# Patient Record
Sex: MALE | Race: WHITE | Employment: FULL TIME | URBAN - NONMETROPOLITAN AREA
[De-identification: names, ages, dates, MRNs, and addresses within clinical notes are randomized per-mention and may not be internally consistent; named-entity substitution may affect disease eponyms.]

---

## 2019-07-17 ENCOUNTER — HOSPITAL ENCOUNTER (EMERGENCY)
Age: 24
Discharge: HOME | End: 2019-07-17

## 2019-07-17 ENCOUNTER — HOSPITAL ENCOUNTER (EMERGENCY)
Dept: GENERAL RADIOLOGY | Age: 24
Discharge: HOME | End: 2019-07-17

## 2019-07-17 VITALS
HEART RATE: 57 BPM | SYSTOLIC BLOOD PRESSURE: 126 MMHG | WEIGHT: 242 LBS | RESPIRATION RATE: 16 BRPM | TEMPERATURE: 98.3 F | BODY MASS INDEX: 33.75 KG/M2 | OXYGEN SATURATION: 97 % | DIASTOLIC BLOOD PRESSURE: 58 MMHG
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
## 2019-07-17 DIAGNOSIS — S62.231A CLOSED DISPLACED FRACTURE OF BASE OF FIRST METACARPAL BONE OF RIGHT HAND, UNSPECIFIED FRACTURE MORPHOLOGY, INITIAL ENCOUNTER: Primary | ICD-10-CM

## 2019-07-17 PROCEDURE — 73130 X-RAY EXAM OF HAND: CPT

## 2019-07-17 PROCEDURE — 29125 APPL SHORT ARM SPLINT STATIC: CPT | Performed by: NURSE PRACTITIONER

## 2019-07-17 PROCEDURE — 99214 OFFICE O/P EST MOD 30 MIN: CPT

## 2019-07-17 PROCEDURE — 29125 APPL SHORT ARM SPLINT STATIC: CPT

## 2019-07-17 PROCEDURE — 2709999900 HC NON-CHARGEABLE SUPPLY

## 2019-07-17 PROCEDURE — 99202 OFFICE O/P NEW SF 15 MIN: CPT | Performed by: NURSE PRACTITIONER

## 2019-07-17 ASSESSMENT — PAIN DESCRIPTION - FREQUENCY: FREQUENCY: INTERMITTENT

## 2019-07-17 ASSESSMENT — ENCOUNTER SYMPTOMS
COLOR CHANGE: 0
VOMITING: 0
NAUSEA: 0
TROUBLE SWALLOWING: 0
SHORTNESS OF BREATH: 0

## 2019-07-17 ASSESSMENT — PAIN DESCRIPTION - LOCATION: LOCATION: HAND

## 2019-07-17 ASSESSMENT — PAIN DESCRIPTION - ORIENTATION: ORIENTATION: RIGHT

## 2019-07-17 ASSESSMENT — PAIN DESCRIPTION - DESCRIPTORS: DESCRIPTORS: ACHING;PINS AND NEEDLES

## 2019-07-17 ASSESSMENT — PAIN SCALES - GENERAL: PAINLEVEL_OUTOF10: 7

## 2019-07-17 ASSESSMENT — PAIN DESCRIPTION - PAIN TYPE: TYPE: ACUTE PAIN

## 2019-07-17 NOTE — ED NOTES
Patient understood instructions verbally,  Follow up with [facility] for recheck fx. Rt. Hand. Educated patient how to  Monitor for any circulation problems, ambulated self to lobby,stable condition. Webril applied to rt. Hand/wrist, thumb spica applied. tolerated well.      Imani Montoya LPN  87/98/97 4711

## 2019-07-17 NOTE — ED PROVIDER NOTES
[address]       Chief Complaint   Patient presents with    Hand Injury     right hand pain, swelling       Nurses Notes reviewed and I agree except as noted in the HPI. HISTORY OF PRESENT ILLNESS   Devorah Lorenzo is a 22 y.o. male who presents with c/o right hand injury. Injury last night while wrestling with his brother. Patient's right hand struck a wall. Patient right handed. Pain aching, intermittent. Rates 7/10, worse with movement/use. No numbness/tingling. Patient works FT at [employer]. No treatment prior to arrival.  No additional complaints. REVIEW OF SYSTEMS     Review of Systems   Constitutional: Negative for chills, diaphoresis, fatigue and fever. HENT: Negative for trouble swallowing. Respiratory: Negative for shortness of breath. Cardiovascular: Negative for chest pain and palpitations. Gastrointestinal: Negative for nausea and vomiting. Musculoskeletal: Positive for arthralgias and joint swelling. Skin: Negative for color change and pallor. Neurological: Negative for weakness and numbness. Psychiatric/Behavioral: Negative for sleep disturbance. PAST MEDICAL HISTORY         Diagnosis Date    Ear infection     Obesity, Class II, BMI 35-39.9        SURGICAL HISTORY     Patient  has a past surgical history that includes mastoidectomy (Left). CURRENT MEDICATIONS     There are no discharge medications for this patient. ALLERGIES     Patient is is allergic to morphine. FAMILY HISTORY     Patient'sfamily history includes Diabetes in his father; High Blood Pressure in his father. SOCIAL HISTORY     Patient  reports that he has been smoking cigarettes and cigars. He has been smoking about 0.10 packs per day. He has never used smokeless tobacco. He reports that he drinks alcohol. He reports that he does not use drugs.     PHYSICAL EXAM     ED TRIAGE VITALS  BP: (!)

## 2020-02-18 ENCOUNTER — HOSPITAL ENCOUNTER (EMERGENCY)
Age: 25
Discharge: HOME | End: 2020-02-18
Payer: COMMERCIAL

## 2020-02-18 VITALS
RESPIRATION RATE: 18 BRPM | WEIGHT: 270 LBS | SYSTOLIC BLOOD PRESSURE: 127 MMHG | TEMPERATURE: 98.4 F | HEART RATE: 78 BPM | OXYGEN SATURATION: 97 % | DIASTOLIC BLOOD PRESSURE: 71 MMHG | BODY MASS INDEX: 37.66 KG/M2

## 2020-02-18 PROCEDURE — 99213 OFFICE O/P EST LOW 20 MIN: CPT | Performed by: NURSE PRACTITIONER

## 2020-02-18 PROCEDURE — 99212 OFFICE O/P EST SF 10 MIN: CPT

## 2020-02-18 RX ORDER — OFLOXACIN 3 MG/ML
5 SOLUTION AURICULAR (OTIC) 3 TIMES DAILY
Qty: 7.5 ML | Refills: 0 | Status: SHIPPED | OUTPATIENT
Start: 2020-02-18 | End: 2020-02-28

## 2020-02-18 ASSESSMENT — ENCOUNTER SYMPTOMS
SHORTNESS OF BREATH: 0
COUGH: 0
VOMITING: 0
NAUSEA: 0
SORE THROAT: 0

## 2020-02-18 ASSESSMENT — PAIN DESCRIPTION - PAIN TYPE: TYPE: ACUTE PAIN

## 2020-02-18 ASSESSMENT — PAIN DESCRIPTION - ORIENTATION: ORIENTATION: LEFT

## 2020-02-18 ASSESSMENT — PAIN DESCRIPTION - LOCATION: LOCATION: EAR

## 2020-02-18 ASSESSMENT — PAIN SCALES - GENERAL: PAINLEVEL_OUTOF10: 10

## 2020-02-18 ASSESSMENT — PAIN DESCRIPTION - FREQUENCY: FREQUENCY: CONTINUOUS

## 2020-12-02 ENCOUNTER — HOSPITAL ENCOUNTER (EMERGENCY)
Age: 25
Discharge: HOME | End: 2020-12-02
Attending: NURSE PRACTITIONER
Payer: COMMERCIAL

## 2020-12-02 VITALS
WEIGHT: 280 LBS | RESPIRATION RATE: 16 BRPM | DIASTOLIC BLOOD PRESSURE: 67 MMHG | SYSTOLIC BLOOD PRESSURE: 139 MMHG | BODY MASS INDEX: 37.93 KG/M2 | OXYGEN SATURATION: 98 % | TEMPERATURE: 97.5 F | HEART RATE: 70 BPM | HEIGHT: 72 IN

## 2020-12-02 PROCEDURE — 99213 OFFICE O/P EST LOW 20 MIN: CPT | Performed by: NURSE PRACTITIONER

## 2020-12-02 PROCEDURE — 99212 OFFICE O/P EST SF 10 MIN: CPT

## 2020-12-02 RX ORDER — ONDANSETRON 4 MG/1
4 TABLET, ORALLY DISINTEGRATING ORAL EVERY 8 HOURS PRN
Qty: 20 TABLET | Refills: 0 | Status: SHIPPED | OUTPATIENT
Start: 2020-12-02 | End: 2021-01-03

## 2020-12-02 ASSESSMENT — ENCOUNTER SYMPTOMS
DIARRHEA: 0
COUGH: 0
RHINORRHEA: 0
SHORTNESS OF BREATH: 0
NAUSEA: 1
CHEST TIGHTNESS: 0
SORE THROAT: 0
VOMITING: 1

## 2020-12-02 NOTE — ED PROVIDER NOTES
[facility]  Urgent Care Encounter       CHIEF COMPLAINT       Chief Complaint   Patient presents with    Emesis       Nurses Notes reviewed and I agree except as noted in the HPI. HISTORY OF PRESENT ILLNESS   Shanna Henley is a 26 y.o. male who presents to the [facility] urgent care for evaluation of nausea and vomiting. Patient reports waking up this morning feeling normal.  He reports driving to work developing nausea. He reports 2-3 episodes of vomiting. He reports at this point he only feels mild nausea. He denies abdominal pain. Foot is mildly tender to right upper quadrant. He does report headache while in the active vomiting. He denies fever, chills, dizziness, blurred vision, chest pain, palpitation, dyspnea, cough,, diarrhea, constipation, and dysuria. The history is provided by the patient. REVIEW OF SYSTEMS     Review of Systems   Constitutional: Negative for activity change, appetite change, chills, fatigue and fever. HENT: Negative for ear discharge, ear pain, rhinorrhea and sore throat. Respiratory: Negative for cough, chest tightness and shortness of breath. Cardiovascular: Negative for chest pain. Gastrointestinal: Positive for nausea and vomiting. Negative for diarrhea. Genitourinary: Negative for dysuria. Skin: Negative for rash. Allergic/Immunologic: Negative for environmental allergies and food allergies. Neurological: Negative for dizziness and headaches. PAST MEDICAL HISTORY         Diagnosis Date    Ear infection     Obesity, Class II, BMI 35-39.9        SURGICALHISTORY     Patient  has a past surgical history that includes mastoidectomy (Left). CURRENT MEDICATIONS       Discharge Medication List as of 12/2/2020  8:41 AM          ALLERGIES     Patient is is allergic to morphine.     Patients   Immunization History   Administered Date(s) Administered    Tdap (Boostrix, Adacel) 09/15/2010       FAMILY HISTORY Patient's family history includes Diabetes in his father; High Blood Pressure in his father. SOCIAL HISTORY     Patient  reports that he has been smoking cigarettes and cigars. He has been smoking about 0.10 packs per day. He has never used smokeless tobacco. He reports current alcohol use. He reports that he does not use drugs. PHYSICAL EXAM     ED TRIAGE VITALS  BP: 139/67, Temp: 97.5 °F (36.4 °C), Pulse: 70, Resp: 16, SpO2: 98 %,Estimated body mass index is 37.97 kg/m² as calculated from the following:    Height as of this encounter: 6' (1.829 m). Weight as of this encounter: 280 lb (127 kg). ,No LMP for male patient. Physical Exam  Vitals signs and nursing note reviewed. Constitutional:       General: He is not in acute distress. Appearance: Normal appearance. He is not ill-appearing, toxic-appearing or diaphoretic. HENT:      Head: Normocephalic. Right Ear: Ear canal and external ear normal.      Left Ear: Ear canal and external ear normal.      Nose: Nose normal. No congestion or rhinorrhea. Mouth/Throat:      Mouth: Mucous membranes are moist.      Pharynx: Oropharynx is clear. No oropharyngeal exudate or posterior oropharyngeal erythema. Neck:      Musculoskeletal: Normal range of motion. Cardiovascular:      Rate and Rhythm: Normal rate. Pulses: Normal pulses. Pulmonary:      Effort: Pulmonary effort is normal. No respiratory distress. Breath sounds: No stridor. No wheezing or rhonchi. Abdominal:      General: Abdomen is flat. Bowel sounds are normal.      Palpations: Abdomen is soft. Tenderness: There is abdominal tenderness (Mild tenderness right upper quadrant). Musculoskeletal: Normal range of motion. General: No swelling or tenderness. Neurological:      General: No focal deficit present. Mental Status: He is alert and oriented to person, place, and time.    Psychiatric:         Mood and Affect: Mood normal.         Behavior: Behavior normal.     None    DIAGNOSTIC RESULTS     Labs:No results found for this visit on 12/02/20. IMAGING:    No orders to display         EKG: None      URGENT CARE COURSE:     Vitals:    12/02/20 0829   BP: 139/67   Pulse: 70   Resp: 16   Temp: 97.5 °F (36.4 °C)   SpO2: 98%   Weight: 280 lb (127 kg)   Height: 6' (1.829 m)       Medications - No data to display         PROCEDURES:  None    FINAL IMPRESSION      1. Non-intractable vomiting with nausea, unspecified vomiting type          DISPOSITION/ PLAN     Patient is agreeable to the above plan. Patient is advised to follow-up with PCP in 2 to 3 days if needed. Patient also provided with a prescription for Zofran 1 tablet to dissolve under tongue every 8 hours as needed for nausea. Patient is also provided with a work note for 1 day. Patient provided an opportunity for questions.        PATIENT REFERRED TO:  Claude Pilot, DO  [address] [place] [address]  / [place] [id number]      DISCHARGE MEDICATIONS:  Discharge Medication List as of 12/2/2020  8:41 AM      START taking these medications    Details   ondansetron (ZOFRAN ODT) 4 MG disintegrating tablet Take 1 tablet by mouth every 8 hours as needed for Nausea or Vomiting, Disp-20 tablet,R-0Normal             Discharge Medication List as of 12/2/2020  8:41 AM          Discharge Medication List as of 12/2/2020  8:41 AM          ZAIDA Mercedes CNP    (Please note that portions of this note were completed with a voice recognition program. Efforts were made to edit the dictations but occasionally words are mis-transcribed.)         ZAIDA Mercedes CNP  12/02/20 7319

## 2020-12-02 NOTE — ED NOTES
Pt complains of having 2 episodes of emesis today at work. States he thinks it is something he ate and will need a work slip.      Yaw Rothman RN  12/02/20 0026

## 2021-01-03 ENCOUNTER — HOSPITAL ENCOUNTER (EMERGENCY)
Age: 26
Discharge: HOME | End: 2021-01-03
Payer: COMMERCIAL

## 2021-01-03 VITALS
HEIGHT: 72 IN | HEART RATE: 68 BPM | OXYGEN SATURATION: 98 % | WEIGHT: 305 LBS | DIASTOLIC BLOOD PRESSURE: 82 MMHG | RESPIRATION RATE: 16 BRPM | SYSTOLIC BLOOD PRESSURE: 139 MMHG | BODY MASS INDEX: 41.31 KG/M2 | TEMPERATURE: 97.9 F

## 2021-01-03 DIAGNOSIS — J02.9 ACUTE PHARYNGITIS, UNSPECIFIED ETIOLOGY: ICD-10-CM

## 2021-01-03 DIAGNOSIS — Z20.822 COVID-19 RULED OUT BY LABORATORY TESTING: Primary | ICD-10-CM

## 2021-01-03 PROCEDURE — 99213 OFFICE O/P EST LOW 20 MIN: CPT

## 2021-01-03 PROCEDURE — U0003 INFECTIOUS AGENT DETECTION BY NUCLEIC ACID (DNA OR RNA); SEVERE ACUTE RESPIRATORY SYNDROME CORONAVIRUS 2 (SARS-COV-2) (CORONAVIRUS DISEASE [COVID-19]), AMPLIFIED PROBE TECHNIQUE, MAKING USE OF HIGH THROUGHPUT TECHNOLOGIES AS DESCRIBED BY CMS-2020-01-R: HCPCS

## 2021-01-03 PROCEDURE — 99214 OFFICE O/P EST MOD 30 MIN: CPT | Performed by: NURSE PRACTITIONER

## 2021-01-03 ASSESSMENT — ENCOUNTER SYMPTOMS
SORE THROAT: 1
COUGH: 0
NAUSEA: 0
WHEEZING: 0
SHORTNESS OF BREATH: 0
VOMITING: 0
DIARRHEA: 0

## 2021-01-03 NOTE — ED NOTES
Pt complains of covid symptoms for 2 days. States he has possibly been exposed.      Manjula Rogel, RN  01/03/21 5123

## 2021-01-04 ENCOUNTER — CARE COORDINATION (OUTPATIENT)
Dept: CARE COORDINATION | Age: 26
End: 2021-01-04

## 2021-01-04 NOTE — CARE COORDINATION
Attempted to reach patient for covid-19 f/u s/p recent UC visit for c/o fever and sore throat. COVID-19 testing was completed and results are pending. Per patient's mother he is not available at this time, and generic voicemail message was left asking patient to please return call to my direct number. Will monitor COVID-19 results and f/u as appropriate.

## 2021-01-05 LAB
PERFORMING LAB: ABNORMAL
REPORT: ABNORMAL
SARS-COV-2: DETECTED

## 2021-01-05 NOTE — CARE COORDINATION
Patient contacted regarding CIXRO-86 diagnosis\". Discussed COVID-19 related testing which was available at this time. Test results were positive. Patient informed of results, if available? Positive COVID-19 results discussed with patient. Care Transition Nurse/ Ambulatory Care Manager contacted the patient by telephone to perform post discharge assessment. Call within 2 business days of discharge: Yes. Verified name and  with patient as identifiers. Provided introduction to self, and explanation of the CTN/ACM role, and reason for call due to risk factors for infection and/or exposure to COVID-19. Symptoms reviewed with patient who verbalized the following symptoms: fever, no new symptoms, no worsening symptoms and sore throat. Due to no new or worsening symptoms encounter was not routed to provider for escalation. Discussed follow-up appointments. If no appointment was previously scheduled, appointment scheduling offered: Patient will call to schedule f/u on his own. [facility] follow up appointment(s): No future appointments. Non-[facility] follow up appointment(s): N/A     Non-face-to-face services provided:  Obtained and reviewed discharge summary and/or continuity of care documents  Education of patient/family/caregiver/guardian to support self-management- COVID-19 education reviewed with patient and patient instructed to call [facility] [address] Dept for further f/u re: quarantine needs. Advance Care Planning:   Does patient have an Advance Directive:  reviewed and current. ACP note completed. Patient has following risk factors of: no known risk factors and recent ED visit. CTN/ACM reviewed discharge instructions, medical action plan and red flags such as increased shortness of breath, increasing fever and signs of decompensation with patient who verbalized understanding.    Discussed exposure protocols and quarantine with CDC Guidelines What to do if you are sick with coronavirus disease 2019. Patient was given an opportunity for questions and concerns. The patient agrees to contact the [facility] exposure line [phone], local health department ,[facility] [address] Dept, and PCP office for questions related to their healthcare. CTN/ACM provided contact information for future needs. Reviewed and educated patient on any new and changed medications related to discharge diagnosis     Patient/family/caregiver given information for GetWell Loop and agrees to enroll - LOOP enrollment completed for Active Symptom Monitoring POC   Patient's preferred e-mail: [email or web address]    Patient's preferred phone number: [phone]   Based on Loop alert triggers, patient will be contacted by nurse care manager for worsening symptoms. Pt will be further monitored by COVID Loop Team based on severity of symptoms and risk factors. Patient returned call for covid-19 f/u s/p recent UC visit for c/o fever and sore throat. Patient denied any new/change/worsening of symptoms. Patient denied any questions re: his discharge instructions. Patient was educated on need to self quarantine and to f/u with local [facility] department. Patient verbalized understanding. Covid-19 education was reviewed and patient verbalized understanding. Patient was reminded to call covid-19 hotline number with any new symptoms or questions/concerns. Patient verbalized understanding and hotline number was provided. Patient denied any other questions, concerns, or needs.

## 2021-01-05 NOTE — ACP (ADVANCE CARE PLANNING)
Advance Care Planning   Healthcare Decision Maker: Today we documented Decision Maker(s) consistent with Legal Next of Kin hierarchy. Healthcare Decision Maker information reviewed and verified with patient.

## 2021-01-05 NOTE — CARE COORDINATION
Final attempt to reach patient for covid-19 f/u s/p recent UC visit for c/o fever and sore throat. COVID-19 testing was completed and results are pending. Patient was not available at the time of my call, and generic voicemail message was left asking patient to please return call to my direct number. No further f/u planned and Episode of Care resolved.

## 2021-10-19 ENCOUNTER — HOSPITAL ENCOUNTER (EMERGENCY)
Age: 26
Discharge: HOME | End: 2021-10-19
Payer: COMMERCIAL

## 2021-10-19 VITALS
HEIGHT: 71 IN | HEART RATE: 78 BPM | TEMPERATURE: 98 F | OXYGEN SATURATION: 98 % | BODY MASS INDEX: 39.2 KG/M2 | RESPIRATION RATE: 18 BRPM | SYSTOLIC BLOOD PRESSURE: 130 MMHG | WEIGHT: 280 LBS | DIASTOLIC BLOOD PRESSURE: 76 MMHG

## 2021-10-19 DIAGNOSIS — U07.1 COVID-19: Primary | ICD-10-CM

## 2021-10-19 DIAGNOSIS — Z20.822 ENCOUNTER FOR LABORATORY TESTING FOR COVID-19 VIRUS: ICD-10-CM

## 2021-10-19 LAB — SARS-COV-2, NAA: DETECTED

## 2021-10-19 PROCEDURE — 99213 OFFICE O/P EST LOW 20 MIN: CPT

## 2021-10-19 PROCEDURE — 87635 SARS-COV-2 COVID-19 AMP PRB: CPT

## 2021-10-19 PROCEDURE — 99213 OFFICE O/P EST LOW 20 MIN: CPT | Performed by: NURSE PRACTITIONER

## 2021-10-19 RX ORDER — ACETAMINOPHEN 325 MG/1
650 TABLET ORAL EVERY 6 HOURS PRN
Qty: 120 TABLET | Refills: 3 | COMMUNITY
Start: 2021-10-19 | End: 2022-10-11

## 2021-10-19 ASSESSMENT — ENCOUNTER SYMPTOMS
ABDOMINAL PAIN: 0
NAUSEA: 0
SHORTNESS OF BREATH: 0
COUGH: 1

## 2021-10-19 NOTE — Clinical Note
Lorri Mcgraw was seen and treated in our emergency department on 10/19/2021. He may return to work on 10/22/2021. If you have any questions or concerns, please don't hesitate to call.       Aida Irwin, ZAIDA - CNP

## 2021-10-19 NOTE — ED PROVIDER NOTES
[facility]  Urgent Care Encounter       CHIEF COMPLAINT       Chief Complaint   Patient presents with    Fever    Cough       Nurses Notes reviewed and I agree except as noted in the HPI. HISTORY OF PRESENT ILLNESS   Guillermina Apley is a 23 y.o. male who presents with complaints of fever and a cough with body aches for the past 2 days. He states he also has mild headaches. He has not tried anything for treatment. He is unsure if anything makes it better or worse. He denies any known exposure to illness. He admits to testing positive for COVID-19 in February 2021. The history is provided by the patient. REVIEW OF SYSTEMS     Review of Systems   Constitutional: Positive for fever. Negative for chills. HENT: Positive for congestion. Respiratory: Positive for cough. Negative for shortness of breath. Cardiovascular: Negative for chest pain. Gastrointestinal: Negative for abdominal pain and nausea. Musculoskeletal: Negative for myalgias. Neurological: Positive for headaches. PAST MEDICAL HISTORY         Diagnosis Date    Ear infection     Obesity, Class II, BMI 35-39.9        SURGICALHISTORY     Patient  has a past surgical history that includes mastoidectomy (Left). CURRENT MEDICATIONS       Previous Medications    No medications on file       ALLERGIES     Patient is is allergic to morphine. Patients   Immunization History   Administered Date(s) Administered    Tdap (Boostrix, Adacel) 09/15/2010       FAMILY HISTORY     Patient's family history includes Diabetes in his father; High Blood Pressure in his father. SOCIAL HISTORY     Patient  reports that he has been smoking cigarettes and cigars. He has been smoking about 0.10 packs per day. He has never used smokeless tobacco. He reports current alcohol use. He reports that he does not use drugs.     PHYSICAL EXAM     ED TRIAGE VITALS  BP: 130/76, Temp: 98 °F (36.7 °C),  , Resp: 18,  ,Estimated body mass index is 39.05 kg/m² as calculated from the following:    Height as of this encounter: 5' 11\" (1.803 m). Weight as of this encounter: 280 lb (127 kg). ,No LMP for male patient. Physical Exam  Vitals and nursing note reviewed. Constitutional:       General: He is not in acute distress. Appearance: He is ill-appearing. HENT:      Right Ear: Tympanic membrane, ear canal and external ear normal.      Left Ear: Tympanic membrane, ear canal and external ear normal.      Mouth/Throat:      Mouth: Mucous membranes are moist.      Pharynx: No posterior oropharyngeal erythema. Cardiovascular:      Rate and Rhythm: Normal rate and regular rhythm. Heart sounds: Normal heart sounds. Pulmonary:      Effort: Pulmonary effort is normal.      Breath sounds: Normal breath sounds. Musculoskeletal:         General: Normal range of motion. Skin:     General: Skin is warm and dry. Neurological:      Mental Status: He is alert and oriented to person, place, and time. DIAGNOSTIC RESULTS     Labs:  Results for orders placed or performed during the hospital encounter of 10/19/21   COVID-19, Rapid   Result Value Ref Range    SARS-CoV-2, GAVIN DETECTED (AA) NOT DETECTED       IMAGING:  None    EKG:  None    URGENT CARE COURSE:     Vitals:    10/19/21 1302   BP: 130/76   Resp: 18   Temp: 98 °F (36.7 °C)   Weight: 280 lb (127 kg)   Height: 5' 11\" (1.803 m)       Medications - No data to display       PROCEDURES:  None    FINAL IMPRESSION      1. COVID-19    2. Encounter for laboratory testing for COVID-19 virus      DISPOSITION/ PLAN   DISPOSITION Decision To Discharge 10/19/2021 01:43:54 PM     Discussed with the patient that exam is consistent with a viral illness and that I believe testing for coronavirus is warranted at this time. Test was completed during visit today and patient is positive for COVID-19. Patient is advised to follow-up as directed by the health department for further direction. Advised to rest and hydrate and use over-the-counter antipyretic medications as needed for fever or body aches. Patient is advised to present to the ER for any worsening symptoms and is agreeable to plan as discussed. PATIENT REFERRED TO:  No primary care provider on file. No primary physician on file.       DISCHARGE MEDICATIONS:  New Prescriptions    ACETAMINOPHEN (AMINOFEN) 325 MG TABLET    Take 2 tablets by mouth every 6 hours as needed for Pain       Discontinued Medications    No medications on file       Current Discharge Medication List          Hernan Kuldip, APRN - CNP    (Please note that portions of this note were completed with a voice recognition program. Efforts were made to edit the dictations but occasionally words are mis-transcribed.)            ZAIDA Bowles CNP  10/19/21 0749

## 2021-10-20 ENCOUNTER — CARE COORDINATION (OUTPATIENT)
Dept: CARE COORDINATION | Age: 26
End: 2021-10-20

## 2021-10-20 NOTE — ACP (ADVANCE CARE PLANNING)
Advance Care Planning   Healthcare Decision Maker:    Primary Decision Maker: Klein Chain - [facility] - [phone]    Today we documented Decision Maker(s) consistent with Legal Next of Kin hierarchy. Healthcare Decision Maker information reviewed and verified with patient.

## 2021-10-20 NOTE — CARE COORDINATION
Patient contacted regarding COVID-19 diagnosis. Discussed COVID-19 related testing which was available at this time. Test results were positive. Patient informed of results, if available? Patient is aware of positive results prior to ACM f/u call being completed. Ambulatory Care Manager contacted the patient by telephone to perform post discharge assessment. Call within 2 business days of discharge: Yes. Verified name and  with patient as identifiers. Provided introduction to self, and explanation of the CTN/ACM role, and reason for call due to risk factors for infection and/or exposure to COVID-19. Symptoms reviewed with patient who verbalized the following symptoms: fever, pain or aching joints, cough, no new symptoms and no worsening symptoms. Due to no new or worsening symptoms encounter was not routed to provider for escalation. Discussed follow-up appointments. If no appointment was previously scheduled, appointment scheduling offered: PCP appointment offered and patient declines scheduling at this time. [facility] follow up appointment(s): No future appointments. Non-[facility] follow up appointment(s): N/A     Non-face-to-face services provided:  Obtained and reviewed discharge summary and/or continuity of care documents  Education of patient/family/caregiver/guardian to support self-management-COVID-19 education reviewed with patient. Patient was educated on signs/symptoms to report and COVID-19 zone information reveiwed. ACM educated on the importance of early symptom recognition and he verbalized understanding. Patient was encouraged to self quarantine as directed and to f/u with local health department. Advance Care Planning:   Does patient have an Advance Directive:  reviewed and current. ACP note completed. Educated patient about risk for severe COVID-19 due to risk factors according to CDC guidelines.  ACM reviewed discharge instructions, medical action plan and red flag symptoms with the patient who verbalized understanding. Discussed COVID vaccination status: No.   Discussed exposure protocols and quarantine with CDC Guidelines. Patient was given an opportunity to verbalize any questions and concerns and agrees to contact ACM or health care provider for questions related to their healthcare. Reviewed and educated patient on any new and changed medications related to discharge diagnosis     Was patient discharged with a pulse oximeter? No      ACM provided contact information. Plan for follow-up call in 3-5 days based on severity of symptoms and risk factors. Patient called for covid-19 f/u s/p recent  visit for c/o fever, cough, and body aches. Patient denied any new/change/worsening of symptoms. Patient shared he is feeling better today and he denied any questions re: his discharge instructions. Patient was educated on signs/symptoms to report and the importance of early symptom recognition. Patient verbalized understanding. ACM reviewed need for patient to self quarantine as directed and he was encouraged to f/u with local health department. Patient verbalized understanding. Covid-19 education was reviewed with patient, and patient verbalized understanding. Patient was reminded to call covid-19 hotline number with any new symptoms or questions/concerns. Patient verbalized understanding. Patient denied any other questions, concerns, or needs.

## 2021-10-22 ENCOUNTER — CARE COORDINATION (OUTPATIENT)
Dept: CARE COORDINATION | Age: 26
End: 2021-10-22

## 2021-10-22 NOTE — CARE COORDINATION
Patient contacted regarding COVID-19 diagnosis. Discussed COVID-19 related testing which was available at this time. Test results were positive. Patient informed of results, if available? Patient is aware of positive results prior to ACM f/u call being completed. Ambulatory Care Manager contacted the patient by telephone to perform follow-up assessment. Verified name and  with patient as identifiers. Patient has following risk factors of: recent UC visit with COVID-19 diagnosis. .      Symptoms reviewed with patient who verbalized the following symptoms: fever, pain or aching joints, cough, no new symptoms and no worsening symptoms. Due to no new or worsening symptoms encounter was not routed to provider for escalation. Educated patient about risk for severe COVID-19 due to risk factors according to CDC guidelines. ACM reviewed discharge instructions, medical action plan and red flag symptoms with the patient who verbalized understanding. Discussed COVID vaccination status: No.   Discussed exposure protocols and quarantine with CDC Guidelines. Patient was given an opportunity to verbalize any questions and concerns and agrees to contact ACM or health care provider for questions related to their healthcare. Was patient discharged with a pulse oximeter? No     ACM provided contact information. No further follow-up call identified based on severity of symptoms and risk factors. Patient called for covid-19 f/u s/p recent UC visit for c/o fever, cough, and body aches. Patient denied any new/change/worsening of symptoms. Patient shared he is feeling better today. Patient was reminded of signs/symptoms he should report and the importance of early symptom recognition, and he verbalized understanding. Patient was reminded of need to complete self quarantine as directed and he acknowledged understanding. Covid-19 education was reviewed with patient, and patient verbalized understanding.   Patient was reminded to call covid-19 hotline number with any new symptoms or questions/concerns. Patient verbalized understanding. Patient denied any other questions, concerns, or needs.

## 2022-01-18 ENCOUNTER — HOSPITAL ENCOUNTER (EMERGENCY)
Age: 27
Discharge: HOME | End: 2022-01-18
Payer: COMMERCIAL

## 2022-01-18 VITALS
DIASTOLIC BLOOD PRESSURE: 70 MMHG | TEMPERATURE: 98.3 F | OXYGEN SATURATION: 98 % | SYSTOLIC BLOOD PRESSURE: 134 MMHG | RESPIRATION RATE: 16 BRPM | HEART RATE: 78 BPM

## 2022-01-18 DIAGNOSIS — Z20.822 ENCOUNTER FOR LABORATORY TESTING FOR COVID-19 VIRUS: ICD-10-CM

## 2022-01-18 DIAGNOSIS — R51.9 ACUTE NONINTRACTABLE HEADACHE, UNSPECIFIED HEADACHE TYPE: Primary | ICD-10-CM

## 2022-01-18 PROCEDURE — 99213 OFFICE O/P EST LOW 20 MIN: CPT | Performed by: NURSE PRACTITIONER

## 2022-01-18 PROCEDURE — 99213 OFFICE O/P EST LOW 20 MIN: CPT

## 2022-01-18 PROCEDURE — 87636 SARSCOV2 & INF A&B AMP PRB: CPT

## 2022-01-18 ASSESSMENT — PAIN SCALES - GENERAL: PAINLEVEL_OUTOF10: 3

## 2022-01-18 ASSESSMENT — ENCOUNTER SYMPTOMS
DIARRHEA: 0
NAUSEA: 0
EYE DISCHARGE: 0
COUGH: 0
VOMITING: 0
EYE REDNESS: 0
SHORTNESS OF BREATH: 0
RHINORRHEA: 0
TROUBLE SWALLOWING: 0
SORE THROAT: 0

## 2022-01-18 ASSESSMENT — PAIN DESCRIPTION - FREQUENCY: FREQUENCY: CONTINUOUS

## 2022-01-18 NOTE — ED TRIAGE NOTES
Brooke Zayas arrives to room with complaint of headache secondary covid exsposure symptoms started 1 days ago.

## 2022-01-18 NOTE — ED PROVIDER NOTES
[address]       Chief Complaint   Patient presents with    Covid Testing    Headache       Nurses Notes reviewed and I agree except as noted in the HPI. HISTORY OF PRESENT ILLNESS   Jerry Jiménez is a 23 y.o. male who presents for COVID-19 testing. Patient complains of a generalized headache. Currently rates 3/10. No associated symptoms. Possible exposure to COVID-19. He states that his employer is requiring a negative test to return to work. REVIEW OF SYSTEMS     Review of Systems   Constitutional: Negative for chills, diaphoresis, fatigue and fever. HENT: Negative for congestion, ear pain, rhinorrhea, sore throat and trouble swallowing. Eyes: Negative for discharge and redness. Respiratory: Negative for cough and shortness of breath. Cardiovascular: Negative for chest pain. Gastrointestinal: Negative for diarrhea, nausea and vomiting. Genitourinary: Negative for decreased urine volume. Musculoskeletal: Negative for neck pain and neck stiffness. Skin: Negative for rash. Neurological: Positive for headaches. Hematological: Negative for adenopathy. Psychiatric/Behavioral: Negative for sleep disturbance. PAST MEDICAL HISTORY         Diagnosis Date    Ear infection     Obesity, Class II, BMI 35-39.9        SURGICAL HISTORY     Patient  has a past surgical history that includes mastoidectomy (Left). CURRENT MEDICATIONS       Discharge Medication List as of 1/18/2022 11:14 AM      CONTINUE these medications which have NOT CHANGED    Details   acetaminophen (AMINOFEN) 325 MG tablet Take 2 tablets by mouth every 6 hours as needed for Pain, Disp-120 tablet, R-3OTC             ALLERGIES     Patient is is allergic to morphine. FAMILY HISTORY     Patient'sfamily history includes Diabetes in his father; High Blood Pressure in his father.     SOCIAL HISTORY     Patient  reports that he has been smoking cigarettes and cigars. He has been smoking about 0.10 packs per day. He has never used smokeless tobacco. He reports current alcohol use. He reports that he does not use drugs. PHYSICAL EXAM     ED TRIAGE VITALS  BP: 134/70, Temp: 98.3 °F (36.8 °C), Pulse: 78, Resp: 16, SpO2: 98 %  Physical Exam  Vitals and nursing note reviewed. Constitutional:       General: He is not in acute distress. Appearance: Normal appearance. He is well-developed. He is not ill-appearing, toxic-appearing or diaphoretic. HENT:      Head: Normocephalic and atraumatic. Jaw: No trismus. Right Ear: Hearing, tympanic membrane, ear canal and external ear normal. No mastoid tenderness. No hemotympanum. Tympanic membrane is not perforated, erythematous or bulging. Left Ear: Hearing, tympanic membrane, ear canal and external ear normal. No mastoid tenderness. No hemotympanum. Tympanic membrane is not perforated, erythematous or bulging. Nose: Nose normal.      Mouth/Throat:      Mouth: Mucous membranes are moist.      Pharynx: Oropharynx is clear. Uvula midline. Tonsils: No tonsillar abscesses. Eyes:      General: No scleral icterus. Conjunctiva/sclera: Conjunctivae normal.   Neck:      Thyroid: No thyromegaly. Trachea: Trachea normal.   Cardiovascular:      Rate and Rhythm: Normal rate and regular rhythm. No extrasystoles are present. Chest Wall: PMI is not displaced. Heart sounds: Normal heart sounds. No murmur heard. No friction rub. No gallop. Pulmonary:      Effort: Pulmonary effort is normal. No accessory muscle usage or respiratory distress. Breath sounds: Normal breath sounds. Chest:   Breasts:      Right: No supraclavicular adenopathy. Left: No supraclavicular adenopathy. Musculoskeletal:      Cervical back: Normal range of motion and neck supple.    Lymphadenopathy:      Head:      Right side of head: No submental, submandibular, tonsillar, preauricular, posterior auricular or occipital adenopathy. Left side of head: No submental, submandibular, tonsillar, preauricular, posterior auricular or occipital adenopathy. Cervical: No cervical adenopathy. Upper Body:      Right upper body: No supraclavicular adenopathy. Left upper body: No supraclavicular adenopathy. Skin:     General: Skin is warm and dry. Coloration: Skin is not pale. Findings: No rash. Comments: Skin intact, warm and dry to touch, no rashes noted on exposed surfaces. Neurological:      Mental Status: He is alert and oriented to person, place, and time. He is not disoriented. Psychiatric:         Mood and Affect: Mood normal.         Behavior: Behavior is cooperative. DIAGNOSTIC RESULTS   Labs: No results found for this visit on 01/18/22. IMAGING:  No orders to display     URGENT CARE COURSE:     Vitals:    01/18/22 1104   BP: 134/70   Pulse: 78   Resp: 16   Temp: 98.3 °F (36.8 °C)   TempSrc: Temporal   SpO2: 98%       Medications - No data to display  PROCEDURES:  None  FINALIMPRESSION      1. Acute nonintractable headache, unspecified headache type    2. Encounter for laboratory testing for COVID-19 virus        DISPOSITION/PLAN   DISPOSITION Decision To Discharge 01/18/2022 11:14:02 AM  Nontoxic, no distress. Results pending. PATIENT REFERRED TO:  [facility] EMERGENCY DEPT  [address]    Follow-up as needed. Results pending. If any distress go to ER.     DISCHARGE MEDICATIONS:  Discharge Medication List as of 1/18/2022 11:14 AM        Discharge Medication List as of 1/18/2022 11:14 AM          [address], APRN - CNP  01/18/22 1128

## 2022-01-18 NOTE — Clinical Note
Kirit Perez was seen and treated in our emergency department on 1/18/2022. He may return to work on 01/20/2022. He may return sooner pending results. If you have any questions or concerns, please don't hesitate to call.       Eileen Cheney, APRN - CNP

## 2022-01-19 LAB
INFLUENZA A: NOT DETECTED
INFLUENZA B: NOT DETECTED
SARS-COV-2 RNA, RT PCR: NOT DETECTED

## 2022-01-24 ENCOUNTER — HOSPITAL ENCOUNTER (EMERGENCY)
Age: 27
Discharge: HOME | End: 2022-01-24
Payer: COMMERCIAL

## 2022-01-24 VITALS
DIASTOLIC BLOOD PRESSURE: 98 MMHG | WEIGHT: 295 LBS | TEMPERATURE: 97 F | BODY MASS INDEX: 41.14 KG/M2 | OXYGEN SATURATION: 97 % | HEART RATE: 81 BPM | SYSTOLIC BLOOD PRESSURE: 141 MMHG | RESPIRATION RATE: 18 BRPM

## 2022-01-24 DIAGNOSIS — Z20.822 LAB TEST NEGATIVE FOR COVID-19 VIRUS: ICD-10-CM

## 2022-01-24 DIAGNOSIS — R09.81 NASAL CONGESTION WITH RHINORRHEA: ICD-10-CM

## 2022-01-24 DIAGNOSIS — J02.9 ACUTE PHARYNGITIS, UNSPECIFIED ETIOLOGY: Primary | ICD-10-CM

## 2022-01-24 DIAGNOSIS — J34.89 NASAL CONGESTION WITH RHINORRHEA: ICD-10-CM

## 2022-01-24 LAB — SARS-COV-2, NAA: NOT  DETECTED

## 2022-01-24 PROCEDURE — 99213 OFFICE O/P EST LOW 20 MIN: CPT | Performed by: NURSE PRACTITIONER

## 2022-01-24 PROCEDURE — 87635 SARS-COV-2 COVID-19 AMP PRB: CPT

## 2022-01-24 PROCEDURE — 99213 OFFICE O/P EST LOW 20 MIN: CPT

## 2022-01-24 ASSESSMENT — ENCOUNTER SYMPTOMS
COUGH: 0
SINUS PRESSURE: 0
SINUS CONGESTION: 1
BACK PAIN: 0
DIARRHEA: 0
SHORTNESS OF BREATH: 0
TROUBLE SWALLOWING: 0
NAUSEA: 0
VOMITING: 0
SORE THROAT: 1
RHINORRHEA: 1

## 2022-01-24 ASSESSMENT — PAIN DESCRIPTION - PAIN TYPE: TYPE: ACUTE PAIN

## 2022-01-24 ASSESSMENT — PAIN SCALES - GENERAL: PAINLEVEL_OUTOF10: 3

## 2022-01-24 ASSESSMENT — PAIN DESCRIPTION - DESCRIPTORS: DESCRIPTORS: ACHING

## 2022-01-24 ASSESSMENT — PAIN DESCRIPTION - LOCATION: LOCATION: HEAD

## 2022-01-24 NOTE — Clinical Note
Eneida Briscoe was seen and treated in our emergency department on 1/24/2022. He may return to work on 01/25/2022. If you have any questions or concerns, please don't hesitate to call.       Ernie Hare, APRN - CNP

## 2022-01-24 NOTE — ED TRIAGE NOTES
Pt walked to room 4. Pt here with complaints of a headache, fatigue, sore throat, loss of taste, chills. Started yesterday.

## 2022-01-24 NOTE — ED PROVIDER NOTES
[facility]  Urgent Care Encounter       CHIEF COMPLAINT       Chief Complaint   Patient presents with    Pharyngitis     Sore throat, fatigue, chills, headache, loss of taste. Started yesterday. Nurses Notes reviewed and I agree except as noted in the HPI. HISTORY OF PRESENT ILLNESS   Genevieve Siddiqui is a 23 y.o. male who presents to the urgent care center complaining of a headache fatigue sore throat loss of taste and smell chills that started yesterday. The history is provided by the patient. No  was used. Pharyngitis  Location:  Generalized  Quality:  Sore  Severity:  Mild  Onset quality:  Gradual  Duration:  1 day  Timing:  Constant  Progression:  Unchanged  Chronicity:  New  Relieved by:  Nothing  Worsened by:  Nothing  Ineffective treatments:  None tried  Associated symptoms: chills, headaches, rhinorrhea and sinus congestion    Associated symptoms: no adenopathy, no chest pain, no cough, no ear pain, no fever, no neck stiffness, no rash, no shortness of breath and no trouble swallowing    Headaches:     Severity:  Mild    Onset quality:  Sudden    Duration:  1 day    Timing:  Constant    Progression:  Unchanged    Chronicity:  New  Risk factors: no exposure to strep and no sick contacts        REVIEW OF SYSTEMS     Review of Systems   Constitutional: Positive for chills and fatigue. Negative for activity change, appetite change and fever. HENT: Positive for congestion, rhinorrhea and sore throat. Negative for ear pain, sinus pressure and trouble swallowing. Respiratory: Negative for cough and shortness of breath. Cardiovascular: Negative for chest pain. Gastrointestinal: Negative for diarrhea, nausea and vomiting. Musculoskeletal: Negative for back pain and neck stiffness. Skin: Negative for rash. Allergic/Immunologic: Negative for environmental allergies. Neurological: Positive for headaches.  Negative for dizziness and light-headedness. Hematological: Negative for adenopathy. PAST MEDICAL HISTORY         Diagnosis Date    Ear infection     Obesity, Class II, BMI 35-39.9        SURGICALHISTORY     Patient  has a past surgical history that includes mastoidectomy (Left). CURRENT MEDICATIONS       Discharge Medication List as of 1/24/2022  2:55 PM      CONTINUE these medications which have NOT CHANGED    Details   acetaminophen (AMINOFEN) 325 MG tablet Take 2 tablets by mouth every 6 hours as needed for Pain, Disp-120 tablet, R-3OTC             ALLERGIES     Patient is is allergic to morphine. Patients   Immunization History   Administered Date(s) Administered    Tdap (Boostrix, Adacel) 09/15/2010       FAMILY HISTORY     Patient's family history includes Diabetes in his father; High Blood Pressure in his father. SOCIAL HISTORY     Patient  reports that he has been smoking cigarettes and cigars. He has been smoking about 0.10 packs per day. He has never used smokeless tobacco. He reports current alcohol use. He reports that he does not use drugs. PHYSICAL EXAM     ED TRIAGE VITALS  BP: (!) 141/98, Temp: 97 °F (36.1 °C), Pulse: 81, Resp: 18, SpO2: 97 %,Estimated body mass index is 41.14 kg/m² as calculated from the following:    Height as of 10/19/21: 5' 11\" (1.803 m). Weight as of this encounter: 295 lb (133.8 kg). ,No LMP for male patient. Physical Exam  Vitals and nursing note reviewed. Constitutional:       General: He is not in acute distress. Appearance: Normal appearance. He is well-developed. He is not ill-appearing, toxic-appearing or diaphoretic. HENT:      Head: Normocephalic. Comments: Frontal headache     Right Ear: Hearing, tympanic membrane, ear canal and external ear normal. No middle ear effusion. Tympanic membrane is not erythematous. Left Ear: Hearing, tympanic membrane, ear canal and external ear normal.  No middle ear effusion. Tympanic membrane is not erythematous. Nose: Congestion and rhinorrhea present. Rhinorrhea is clear. Right Turbinates: Not enlarged or swollen. Left Turbinates: Not enlarged or swollen. Mouth/Throat:      Lips: Pink. Mouth: Mucous membranes are moist.      Tongue: No lesions. Pharynx: Oropharynx is clear. Uvula midline. No pharyngeal swelling, oropharyngeal exudate, posterior oropharyngeal erythema or uvula swelling. Tonsils: No tonsillar exudate or tonsillar abscesses. Eyes:      Conjunctiva/sclera: Conjunctivae normal.      Pupils: Pupils are equal, round, and reactive to light. Cardiovascular:      Rate and Rhythm: Normal rate and regular rhythm. Heart sounds: Normal heart sounds, S1 normal and S2 normal.   Pulmonary:      Effort: Pulmonary effort is normal. No accessory muscle usage. Breath sounds: Normal breath sounds. No decreased air movement. No decreased breath sounds, wheezing, rhonchi or rales. Musculoskeletal:      Cervical back: Full passive range of motion without pain, normal range of motion and neck supple. No rigidity. Lymphadenopathy:      Head:      Right side of head: No submental, submandibular, tonsillar, preauricular, posterior auricular or occipital adenopathy. Left side of head: No submental, submandibular, tonsillar, preauricular, posterior auricular or occipital adenopathy. Cervical: No cervical adenopathy. Right cervical: No superficial, deep or posterior cervical adenopathy. Left cervical: No superficial, deep or posterior cervical adenopathy. Skin:     General: Skin is warm and dry. Capillary Refill: Capillary refill takes less than 2 seconds. Neurological:      General: No focal deficit present. Mental Status: He is alert and oriented to person, place, and time. Psychiatric:         Mood and Affect: Mood normal.         Speech: Speech normal.         Behavior: Behavior normal. Behavior is cooperative.          DIAGNOSTIC RESULTS Labs:  Results for orders placed or performed during the hospital encounter of 01/24/22   COVID-19, Rapid   Result Value Ref Range    SARS-CoV-2, GAVIN NOT  DETECTED NOT DETECTED       IMAGING:    No orders to display         EKG:      URGENT CARE COURSE:     Vitals:    01/24/22 1430   BP: (!) 141/98   Pulse: 81   Resp: 18   Temp: 97 °F (36.1 °C)   TempSrc: Temporal   SpO2: 97%   Weight: 295 lb (133.8 kg)       Medications - No data to display         PROCEDURES:  None    FINAL IMPRESSION      1. Acute pharyngitis, unspecified etiology    2. Lab test negative for COVID-19 virus    3. Nasal congestion with rhinorrhea          DISPOSITION/ PLAN      The patient was advised to take medication as directed. The patient was also advised to drink lots of fluids, monitor urine output for hydration status or dark colored urine. The patient could take Motrin or Tylenol for comfort, pain and fever. The Patient/Patient representative was advised to monitor for any changes such as fever not relieved with Motrin or Tylenol. Also monitor for any difficulty swallowing, neck pain or stiffness, increase in swollen glands, the development of rash or any other concerns they are to dial 911 or go to the emergency department for reevaluation and further management. If the patient does not experience any of the above symptoms now to follow-up with her primary care provider for reevaluation in 3-5 days. The patient/Patient representative are agreeable to the treatment plan at this time the patient is not in acute distress and the patient left in stable condition. PATIENT REFERRED TO:  No primary care provider on file. No primary physician on file.       DISCHARGE MEDICATIONS:  Discharge Medication List as of 1/24/2022  2:55 PM          Discharge Medication List as of 1/24/2022  2:55 PM          Discharge Medication List as of 1/24/2022  2:55 PM          ZAIDA Levine - CNP    (Please note that portions of this note were completed with a voice recognition program. Efforts were made to edit the dictations but occasionally words are mis-transcribed.)           Tatiana Perera, ZAIDA - CNP  01/24/22 6109

## 2022-03-16 ENCOUNTER — HOSPITAL ENCOUNTER (EMERGENCY)
Age: 27
Discharge: HOME | End: 2022-03-16
Attending: EMERGENCY MEDICINE
Payer: COMMERCIAL

## 2022-03-16 VITALS
TEMPERATURE: 98.3 F | HEART RATE: 78 BPM | OXYGEN SATURATION: 98 % | SYSTOLIC BLOOD PRESSURE: 132 MMHG | RESPIRATION RATE: 16 BRPM | DIASTOLIC BLOOD PRESSURE: 76 MMHG

## 2022-03-16 DIAGNOSIS — F17.200 TOBACCO USE DISORDER: ICD-10-CM

## 2022-03-16 DIAGNOSIS — U07.1 COVID-19 VIRUS INFECTION: Primary | ICD-10-CM

## 2022-03-16 LAB — SARS-COV-2, NAA: DETECTED

## 2022-03-16 PROCEDURE — 99213 OFFICE O/P EST LOW 20 MIN: CPT | Performed by: EMERGENCY MEDICINE

## 2022-03-16 PROCEDURE — 87635 SARS-COV-2 COVID-19 AMP PRB: CPT

## 2022-03-16 PROCEDURE — 99213 OFFICE O/P EST LOW 20 MIN: CPT

## 2022-03-16 RX ORDER — DEXTROMETHORPHAN HYDROBROMIDE AND PROMETHAZINE HYDROCHLORIDE 15; 6.25 MG/5ML; MG/5ML
5 SYRUP ORAL 4 TIMES DAILY PRN
Qty: 120 ML | Refills: 0 | Status: SHIPPED | OUTPATIENT
Start: 2022-03-16 | End: 2022-03-20

## 2022-03-16 RX ORDER — IBUPROFEN 600 MG/1
600 TABLET ORAL EVERY 6 HOURS PRN
Qty: 20 TABLET | Refills: 0 | Status: SHIPPED | OUTPATIENT
Start: 2022-03-16 | End: 2022-10-11

## 2022-03-16 RX ORDER — CETIRIZINE HYDROCHLORIDE, PSEUDOEPHEDRINE HYDROCHLORIDE 5; 120 MG/1; MG/1
1 TABLET, FILM COATED, EXTENDED RELEASE ORAL 2 TIMES DAILY
Qty: 30 TABLET | Refills: 1 | Status: SHIPPED | OUTPATIENT
Start: 2022-03-16 | End: 2022-04-15

## 2022-03-16 ASSESSMENT — ENCOUNTER SYMPTOMS
NAUSEA: 0
ABDOMINAL PAIN: 0
STRIDOR: 0
TROUBLE SWALLOWING: 0
SHORTNESS OF BREATH: 0
SINUS PAIN: 1
VOICE CHANGE: 0
WHEEZING: 0
BACK PAIN: 0
EYE PAIN: 0
VOMITING: 0
RHINORRHEA: 1
EYE DISCHARGE: 0
DIARRHEA: 0
COUGH: 0
ROS SKIN COMMENTS: NO RASH OR BRUISING
SINUS PRESSURE: 1
SORE THROAT: 1
EYE REDNESS: 0

## 2022-03-16 NOTE — Clinical Note
Naeem Huntley was seen and treated in our emergency department on 3/16/2022. He may return to work on 03/28/2022. No work starting 3/16/2022 until cleared by primary care physician or health department     If you have any questions or concerns, please don't hesitate to call.       Kofi Mccarty MD

## 2022-03-16 NOTE — ED PROVIDER NOTES
[id number]       Chief Complaint   Patient presents with    Cough    Nasal Congestion       Nurses Notes reviewed and I agree except as noted in the HPI. HISTORY OF PRESENT ILLNESS   Ermelinda Luu is a 23 y.o. male who presents with 24-hour history of cough, congestion, clear rhinitis, postnasal drainage, scratchy throat. No fever, vomiting, chest pain, shortness of breath, wheezing, dizziness, syncope, rash, diarrhea,  symptoms. COVID-19 virus infection October 2021. Employer requesting Covid testing. No history of diabetes or asthma. Smokes cigarettes  REVIEW OF SYSTEMS     Review of Systems   Constitutional: Negative for appetite change, chills, fatigue, fever and unexpected weight change. Decreased appetite no fever   HENT: Positive for congestion, postnasal drip, rhinorrhea, sinus pressure, sinus pain and sore throat. Negative for ear discharge, ear pain, sneezing, trouble swallowing and voice change. Congestion, postnasal drainage, sinus pressure, scratchy throat   Eyes: Negative for pain, discharge and redness. No Erythema or drainage   Respiratory: Negative for cough, shortness of breath, wheezing and stridor. Cough no shortness of breath   Cardiovascular: Negative for chest pain and leg swelling. No chest pain or syncope   Gastrointestinal: Negative for abdominal pain, diarrhea, nausea and vomiting. No vomiting or abdominal pain   Genitourinary: Negative for dysuria, frequency, hematuria and urgency. Musculoskeletal: Negative for arthralgias, back pain, myalgias and neck pain. Skin: Negative for rash. No rash or bruising   Neurological: Negative for dizziness, syncope, weakness and headaches. Headache and sinus pain   Hematological: Negative for adenopathy. Psychiatric/Behavioral: Negative for behavioral problems, confusion, sleep disturbance and suicidal ideas.  The patient is not nervous/anxious. red and bold elements reviewed    PAST MEDICAL HISTORY         Diagnosis Date    Ear infection     Obesity, Class II, BMI 35-39.9        SURGICAL HISTORY     Patient  has a past surgical history that includes mastoidectomy (Left). CURRENT MEDICATIONS       Discharge Medication List as of 3/16/2022  5:48 PM      CONTINUE these medications which have NOT CHANGED    Details   acetaminophen (AMINOFEN) 325 MG tablet Take 2 tablets by mouth every 6 hours as needed for Pain, Disp-120 tablet, R-3OTC             ALLERGIES     Patient is is allergic to morphine. FAMILY HISTORY     Patient'sfamily history includes Diabetes in his father; High Blood Pressure in his father. SOCIAL HISTORY     Patient  reports that he has been smoking cigarettes and cigars. He has been smoking about 0.10 packs per day. He has never used smokeless tobacco. He reports current alcohol use. He reports that he does not use drugs. PHYSICAL EXAM     ED TRIAGE VITALS  BP: 132/76, Temp: 98.3 °F (36.8 °C), Pulse: 78, Resp: 16, SpO2: 98 %  Physical Exam  Vitals and nursing note reviewed. Constitutional:       General: He is not in acute distress. Appearance: He is well-developed. He is not ill-appearing. Comments: Congestion, clear rhinitis   HENT:      Head: Normocephalic and atraumatic. Right Ear: External ear normal.      Left Ear: External ear normal.      Nose: Nose normal.      Mouth/Throat:      Pharynx: Posterior oropharyngeal erythema present. No oropharyngeal exudate. Tonsils: 0 on the right. 0 on the left. Comments: Pharyngeal erythema no exudate or abscess  Eyes:      General: No scleral icterus. Right eye: No discharge. Left eye: No discharge. Extraocular Movements:      Right eye: Normal extraocular motion. Left eye: Normal extraocular motion.       Conjunctiva/sclera: Conjunctivae normal.      Pupils: Pupils are equal, round, and reactive to light.      Comments: Conjunctiva clear   Neck:      Thyroid: No thyromegaly. Vascular: No JVD. Comments: No meningismus  Cardiovascular:      Rate and Rhythm: Normal rate and regular rhythm. Pulses: Normal pulses. Heart sounds: Normal heart sounds, S1 normal and S2 normal. No murmur heard. No friction rub. No gallop. Comments: No murmur  Pulmonary:      Effort: Pulmonary effort is normal. No tachypnea or respiratory distress. Breath sounds: Normal breath sounds. No stridor. No decreased breath sounds, wheezing, rhonchi or rales. Comments: Dry cough lungs clear throughout  Chest:      Chest wall: No tenderness. Abdominal:      General: Bowel sounds are normal. There is no distension. Palpations: Abdomen is soft. There is no mass. Tenderness: There is no abdominal tenderness. There is no guarding or rebound. Musculoskeletal:         General: No tenderness. Normal range of motion. Cervical back: Normal range of motion. No spinous process tenderness or muscular tenderness. Comments: Extremities normal     Lymphadenopathy:      Cervical: No cervical adenopathy. Right cervical: No superficial cervical adenopathy. Left cervical: No superficial cervical adenopathy. Skin:     General: Skin is warm and dry. Findings: No erythema or rash. Comments: No rash or bruising   Neurological:      Mental Status: He is alert and oriented to person, place, and time. Cranial Nerves: No cranial nerve deficit. Motor: No abnormal muscle tone. Coordination: Coordination normal.      Deep Tendon Reflexes: Reflexes are normal and symmetric. Reflexes normal.      Comments: Appropriate no focal    Psychiatric:         Behavior: Behavior normal.         Thought Content:  Thought content normal.         Judgment: Judgment normal.         DIAGNOSTIC RESULTS   Labs:   Results for orders placed or performed during the hospital encounter of 03/16/22 COVID-19, Rapid   Result Value Ref Range    SARS-CoV-2, GAVIN DETECTED (AA) NOT DETECTED       IMAGING:  No orders to display     URGENT CARE COURSE:     Vitals:    03/16/22 1707   BP: 132/76   Pulse: 78   Resp: 16   Temp: 98.3 °F (36.8 °C)   TempSrc: Temporal   SpO2: 98%       Medications - No data to display  PROCEDURES:  None  FINALIMPRESSION      1. COVID-19 virus infection    2. Tobacco use disorder        DISPOSITION/PLAN   DISPOSITION Decision To Discharge 03/16/2022 05:39:06 PM  Nontoxic, well-hydrated, normal airway. Rapid Covid positive. No bacterial infection. Patient given written and verbal instructions for COVID-19 treatment and quarantine. Will treat with Zyrtec-D, Phenergan DM, Tylenol, increased oral clear liquids, vaporizer, rest.  Patient to follow-up with family medicine practice in 5 days, and he understands to go to ED if worse. In addition, patient understands importance of smoke cessation and was given written instructions to quit smoking  PATIENT REFERRED TO:  [address] [place] [address],[address]  [facility]. [facility]  Schedule an appointment as soon as possible for a visit in 5 days  Recheck in office, go to emergency if worse    DISCHARGE MEDICATIONS:  Discharge Medication List as of 3/16/2022  5:48 PM      START taking these medications    Details   cetirizine-psuedoephedrine (ZYRTEC-D) 5-120 MG per extended release tablet Take 1 tablet by mouth 2 times daily 1 p.o. twice daily for sinus pain and pressure, congestion,, postnasal drainage, ear pain pressure, cough, Disp-30 tablet, R-1Print      promethazine-dextromethorphan (PROMETHAZINE-DM) 6.25-15 MG/5ML syrup Take 5 mLs by mouth 4 times daily as needed for Cough, Disp-120 mL, R-0Print      ibuprofen (IBU) 600 MG tablet Take 1 tablet by mouth every 6 hours as needed for Pain or Fever (Take with food 3 times daily for pain or fever), Disp-20 tablet, R-0Print           Discharge Medication List as of 3/16/2022  5:48 PM          MD Antonio Dalal MD  03/16/22 0994

## 2022-03-17 ENCOUNTER — CARE COORDINATION (OUTPATIENT)
Dept: CARE COORDINATION | Age: 27
End: 2022-03-17

## 2022-03-18 ENCOUNTER — CARE COORDINATION (OUTPATIENT)
Dept: CARE COORDINATION | Age: 27
End: 2022-03-18

## 2022-03-18 NOTE — CARE COORDINATION
Attempted to reach [facility] today for  f/u COVID outreach. No answer. VM full. Unable to leave message.

## 2022-10-10 NOTE — PROGRESS NOTES
medications for this visit. Allergies   Allergen Reactions    Morphine Nausea And Vomiting       Health Maintenance   Topic Date Due    COVID-19 Vaccine (1) Never done    Varicella vaccine (1 of 2 - 2-dose childhood series) Never done    Pneumococcal 0-64 years Vaccine (1 - PCV) Never done    HPV vaccine (1 - Male 2-dose series) Never done    Hepatitis C screen  Never done    DTaP/Tdap/Td vaccine (2 - Td or Tdap) 09/15/2020    Flu vaccine (1) 10/11/2023 (Originally 8/1/2022)    Depression Screen  10/11/2023    HIV screen  Completed    Hepatitis A vaccine  Aged Out    Hib vaccine  Aged Out    Meningococcal (ACWY) vaccine  Aged Out       Subjective:      Review of Systems   Constitutional:  Negative for chills and fever. Respiratory:  Negative for cough and shortness of breath. Cardiovascular:  Negative for chest pain and leg swelling. Gastrointestinal:  Negative for abdominal pain, nausea and vomiting. Neurological:  Negative for dizziness and light-headedness. Psychiatric/Behavioral:  Negative for dysphoric mood. Objective:     Physical Exam  Vitals and nursing note reviewed. Constitutional:       General: He is not in acute distress. Appearance: Normal appearance. He is well-developed. He is obese. He is not ill-appearing or toxic-appearing. HENT:      Head: Normocephalic and atraumatic. Right Ear: Tympanic membrane, ear canal and external ear normal.      Left Ear: Tympanic membrane, ear canal and external ear normal.      Nose: Nose normal.      Mouth/Throat:      Lips: Pink. Mouth: Mucous membranes are moist.      Pharynx: Oropharynx is clear. Uvula midline. Eyes:      General: Lids are normal.      Conjunctiva/sclera: Conjunctivae normal.      Pupils: Pupils are equal, round, and reactive to light. Neck:      Thyroid: No thyromegaly. Cardiovascular:      Rate and Rhythm: Normal rate and regular rhythm. Heart sounds: Normal heart sounds.  No murmur Panel; Future  -     Hemoglobin A1C; Future    Plan to notify patient of lab results once back. We will determine if patient needs sooner follow-up based off of lab results.     Return in about 1 year (around 10/11/2023) for yearly wellness exam.    Electronically signed by Angel Barnes DO on 10/11/2022 at 2:25 PM

## 2022-10-11 ENCOUNTER — OFFICE VISIT (OUTPATIENT)
Dept: FAMILY MEDICINE CLINIC | Age: 27
End: 2022-10-11
Payer: COMMERCIAL

## 2022-10-11 VITALS
BODY MASS INDEX: 42.66 KG/M2 | SYSTOLIC BLOOD PRESSURE: 118 MMHG | OXYGEN SATURATION: 97 % | RESPIRATION RATE: 20 BRPM | HEART RATE: 82 BPM | DIASTOLIC BLOOD PRESSURE: 70 MMHG | WEIGHT: 315 LBS | HEIGHT: 72 IN

## 2022-10-11 DIAGNOSIS — Z87.898 FORMER CONSUMPTION OF ALCOHOL: ICD-10-CM

## 2022-10-11 DIAGNOSIS — Z00.00 ENCOUNTER FOR WELLNESS EXAMINATION IN ADULT: Primary | ICD-10-CM

## 2022-10-11 DIAGNOSIS — Z11.59 NEED FOR HEPATITIS C SCREENING TEST: ICD-10-CM

## 2022-10-11 DIAGNOSIS — F17.200 TOBACCO DEPENDENCE: ICD-10-CM

## 2022-10-11 DIAGNOSIS — Z13.220 LIPID SCREENING: ICD-10-CM

## 2022-10-11 PROCEDURE — 99385 PREV VISIT NEW AGE 18-39: CPT | Performed by: STUDENT IN AN ORGANIZED HEALTH CARE EDUCATION/TRAINING PROGRAM

## 2022-10-11 SDOH — ECONOMIC STABILITY: FOOD INSECURITY: WITHIN THE PAST 12 MONTHS, THE FOOD YOU BOUGHT JUST DIDN'T LAST AND YOU DIDN'T HAVE MONEY TO GET MORE.: NEVER TRUE

## 2022-10-11 SDOH — ECONOMIC STABILITY: FOOD INSECURITY: WITHIN THE PAST 12 MONTHS, YOU WORRIED THAT YOUR FOOD WOULD RUN OUT BEFORE YOU GOT MONEY TO BUY MORE.: NEVER TRUE

## 2022-10-11 ASSESSMENT — PATIENT HEALTH QUESTIONNAIRE - PHQ9
1. LITTLE INTEREST OR PLEASURE IN DOING THINGS: 0
SUM OF ALL RESPONSES TO PHQ QUESTIONS 1-9: 0
SUM OF ALL RESPONSES TO PHQ9 QUESTIONS 1 & 2: 0
SUM OF ALL RESPONSES TO PHQ QUESTIONS 1-9: 0
2. FEELING DOWN, DEPRESSED OR HOPELESS: 0

## 2022-10-11 ASSESSMENT — SOCIAL DETERMINANTS OF HEALTH (SDOH): HOW HARD IS IT FOR YOU TO PAY FOR THE VERY BASICS LIKE FOOD, HOUSING, MEDICAL CARE, AND HEATING?: NOT HARD AT ALL

## 2022-10-11 ASSESSMENT — ENCOUNTER SYMPTOMS
ABDOMINAL PAIN: 0
COUGH: 0
SHORTNESS OF BREATH: 0
VOMITING: 0
NAUSEA: 0

## 2022-10-21 ENCOUNTER — TELEMEDICINE (OUTPATIENT)
Dept: FAMILY MEDICINE CLINIC | Age: 27
End: 2022-10-21
Payer: COMMERCIAL

## 2022-10-21 DIAGNOSIS — J01.80 ACUTE NON-RECURRENT SINUSITIS OF OTHER SINUS: ICD-10-CM

## 2022-10-21 DIAGNOSIS — J40 BRONCHITIS: Primary | ICD-10-CM

## 2022-10-21 PROCEDURE — 99421 OL DIG E/M SVC 5-10 MIN: CPT | Performed by: FAMILY MEDICINE

## 2022-10-21 RX ORDER — DOXYCYCLINE HYCLATE 100 MG
100 TABLET ORAL 2 TIMES DAILY
Qty: 14 TABLET | Refills: 0 | Status: SHIPPED | OUTPATIENT
Start: 2022-10-21 | End: 2022-10-28

## 2022-10-21 RX ORDER — BENZONATATE 100 MG/1
100 CAPSULE ORAL 3 TIMES DAILY PRN
Qty: 21 CAPSULE | Refills: 0 | Status: SHIPPED | OUTPATIENT
Start: 2022-10-21 | End: 2022-10-28

## 2022-10-21 ASSESSMENT — ENCOUNTER SYMPTOMS
RHINORRHEA: 1
SINUS PAIN: 1
SORE THROAT: 1
SHORTNESS OF BREATH: 1
WHEEZING: 0
COUGH: 1
SINUS PRESSURE: 1
CHEST TIGHTNESS: 1

## 2022-10-21 NOTE — PROGRESS NOTES
Elliot Carreno (:  1995) is a Established patient, here for evaluation of the following:    Assessment & Plan   Below is the assessment and plan developed based on review of pertinent history, physical exam, labs, studies, and medications. 1. Bronchitis  Tessalon sent, ok for inhaler  2. Acute non-recurrent sinusitis of other sinus  Progressive symptoms this week, sent doxy. Subjective   Pt reports progression this week of productive cough, SOB, no fever. Home covid test was negative. No OTC meds. Had an old inhaler from March, this is helping. Congestion and ST. Step daughter has been sick with similar symptoms. Review of Systems   Constitutional:  Positive for chills and fatigue. Negative for fever and unexpected weight change. HENT:  Positive for ear pain, rhinorrhea, sinus pressure, sinus pain and sore throat. Respiratory:  Positive for cough, chest tightness and shortness of breath. Negative for wheezing.          Objective   Patient-Reported Vitals  No data recorded     Physical Exam  [INSTRUCTIONS:  \"[x]\" Indicates a positive item  \"[]\" Indicates a negative item  -- DELETE ALL ITEMS NOT EXAMINED]    Constitutional: [x] Appears well-developed and well-nourished [x] No apparent distress      [] Abnormal -     Mental status: [x] Alert and awake  [x] Oriented to person/place/time [x] Able to follow commands    [] Abnormal -     Eyes:   EOM    [x]  Normal    [] Abnormal -   Sclera  [x]  Normal    [] Abnormal -          Discharge [x]  None visible   [] Abnormal -     HENT: [x] Normocephalic, atraumatic  [] Abnormal -   [x] Mouth/Throat: Mucous membranes are moist    External Ears [x] Normal  [] Abnormal -    Neck: [x] No visualized mass [] Abnormal -     Pulmonary/Chest: [x] Respiratory effort normal   [x] No visualized signs of difficulty breathing or respiratory distress        [] Abnormal -      Musculoskeletal:   [x] Normal gait with no signs of ataxia         [x] Normal range of motion of neck        [] Abnormal -     Neurological:        [x] No Facial Asymmetry (Cranial nerve 7 motor function) (limited exam due to video visit)          [x] No gaze palsy        [] Abnormal -          Skin:        [x] No significant exanthematous lesions or discoloration noted on facial skin         [] Abnormal -            Psychiatric:       [x] Normal Affect [] Abnormal -        [x] No Hallucinations    Other pertinent observable physical exam findings:-         On this date 10/21/2022 I have spent 10 minutes reviewing previous notes, test results and face to face (virtual) with the patient discussing the diagnosis and importance of compliance with the treatment plan as well as documenting on the day of the visitConcepción Dick, was evaluated through a synchronous (real-time) audio-video encounter. The patient (or guardian if applicable) is aware that this is a billable service, which includes applicable co-pays. This Virtual Visit was conducted with patient's (and/or legal guardian's) consent. The visit was conducted pursuant to the emergency declaration under the [facility], [address] [place] [address] authority and the [employer] General Act. Patient identification was verified, and a caregiver was present when appropriate. The patient was located at [place]  Provider was located at [facility] ([place] [address]): [address] [facility],  [address] [facility].         --Ramiro Pak MD

## 2023-12-13 NOTE — PROGRESS NOTES
[address] [facility] MEDICINE  [address] [place] [id number] [phone]     Alba Crooks is a 33 y.o. male who presents today for:  Chief Complaint   Patient presents with    Cough     Sx started early Monday morning, needs a work note to go black to work     Congestion    Emesis    Diarrhea       Assessment/Plan:     Chacha Montanez was seen today for cough, congestion, emesis and diarrhea. Diagnoses and all orders for this visit:    Nausea and vomiting, unspecified vomiting type      Getting better, eating a little bit more, drinking water. Needs work note to go back. Declines further testing or medications at this time. Recommended over-the-counter Pepto-Bismol as needed for GI symptoms. Otherwise continue with small frequent meals, increased hydration and resting when able. Work note provided. No follow-ups on file. Medications Prescribed:  No orders of the defined types were placed in this encounter. No future appointments. HPI:     HPI  72-year-old male with past medical history significant for fatigue and obesity who presents for an acute visit needing a work note to return to work. States was out Monday Tuesday Wednesday for GI symptoms including nausea, vomiting, diarrhea. Feeling better, try to go to work on Thursday but work needs a note to clear them to go back to work. States did not do any medications, adjusted hydration. Was not tested for anything. Daughter is a third-grader at [employer] and this illness has been going around. No fevers, blood in the stool or vomit. Subjective:      Review of Systems   Constitutional:  Negative for chills, diaphoresis, fatigue and fever. Respiratory:  Negative for cough and shortness of breath. Cardiovascular:  Negative for chest pain and leg swelling. Gastrointestinal:  Positive for nausea. Negative for constipation, diarrhea and vomiting.         Nausea

## 2023-12-14 ENCOUNTER — OFFICE VISIT (OUTPATIENT)
Dept: FAMILY MEDICINE CLINIC | Age: 28
End: 2023-12-14

## 2023-12-14 VITALS
BODY MASS INDEX: 42.66 KG/M2 | RESPIRATION RATE: 20 BRPM | TEMPERATURE: 98.8 F | SYSTOLIC BLOOD PRESSURE: 122 MMHG | HEART RATE: 88 BPM | DIASTOLIC BLOOD PRESSURE: 82 MMHG | WEIGHT: 315 LBS | HEIGHT: 72 IN | OXYGEN SATURATION: 98 %

## 2023-12-14 DIAGNOSIS — R11.2 NAUSEA AND VOMITING, UNSPECIFIED VOMITING TYPE: Primary | ICD-10-CM

## 2023-12-14 PROCEDURE — 99213 OFFICE O/P EST LOW 20 MIN: CPT | Performed by: STUDENT IN AN ORGANIZED HEALTH CARE EDUCATION/TRAINING PROGRAM

## 2023-12-14 ASSESSMENT — PATIENT HEALTH QUESTIONNAIRE - PHQ9
SUM OF ALL RESPONSES TO PHQ9 QUESTIONS 1 & 2: 0
2. FEELING DOWN, DEPRESSED OR HOPELESS: 0
SUM OF ALL RESPONSES TO PHQ QUESTIONS 1-9: 0
1. LITTLE INTEREST OR PLEASURE IN DOING THINGS: 0
SUM OF ALL RESPONSES TO PHQ QUESTIONS 1-9: 0

## 2024-02-15 NOTE — PROGRESS NOTES
[facility] PROFESSIONAL SERVS  [facility]  [address] [facility] [address]  Dept: [phone]  Dept Fax: [phone]  Loc: [phone]    Bryan Lomeli is a 28 y.o. male who presents today for his medical conditions/complaints as noted below.     Chief Complaint   Patient presents with    Other     Pt states that he messed up knee years ago and bothers him form time to time but he needs a clearance to go back to work stating he can lift 40 pounds        HPI:     Patient presents the office today with his partner for work clearance.  States that he had a right knee injury 5 years ago in which he fell down a flight of stairs and hit his knee on a beam.  Was not evaluated at that time and did not require any surgery.  Typically only has a flareup of his pain about once a year at most.  States that it will sometimes \"pop out of place\", but denies any dislocation of his knee.  States that symptoms resolve on their own fairly quickly each time.  Last episode prior to this time was over 1 year ago.  States that several days ago, he tweaked his right knee and limped briefly.  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  Patient denies any current knee pain, weakness, skin changes.  Able to walk and squat without pain or difficulty.        Past Medical History:   Diagnosis Date    Ear infection     Obesity, Class II, BMI 35-39.9       Past Surgical History:   Procedure Laterality Date    MASTOIDECTOMY Left        Family History   Problem Relation Age of Onset    Diabetes Father     High Blood Pressure Father     Colon Cancer Neg Hx     Prostate Cancer Neg Hx        Social History     Tobacco Use    Smoking status: Every Day     Current packs/day: 0.10     Types: Cigarettes, Cigars    Smokeless tobacco: Never   Substance Use Topics    Alcohol use: Yes     Comment:

## 2024-02-19 ENCOUNTER — OFFICE VISIT (OUTPATIENT)
Dept: FAMILY MEDICINE CLINIC | Age: 29
End: 2024-02-19

## 2024-02-19 VITALS
HEIGHT: 72 IN | WEIGHT: 315 LBS | SYSTOLIC BLOOD PRESSURE: 118 MMHG | OXYGEN SATURATION: 98 % | RESPIRATION RATE: 16 BRPM | BODY MASS INDEX: 42.66 KG/M2 | HEART RATE: 96 BPM | DIASTOLIC BLOOD PRESSURE: 82 MMHG

## 2024-02-19 DIAGNOSIS — M25.561 ACUTE PAIN OF RIGHT KNEE: Primary | ICD-10-CM

## 2024-02-19 PROCEDURE — 99213 OFFICE O/P EST LOW 20 MIN: CPT | Performed by: STUDENT IN AN ORGANIZED HEALTH CARE EDUCATION/TRAINING PROGRAM

## 2024-02-19 ASSESSMENT — PATIENT HEALTH QUESTIONNAIRE - PHQ9
SUM OF ALL RESPONSES TO PHQ QUESTIONS 1-9: 0
1. LITTLE INTEREST OR PLEASURE IN DOING THINGS: 0
SUM OF ALL RESPONSES TO PHQ QUESTIONS 1-9: 0
2. FEELING DOWN, DEPRESSED OR HOPELESS: 0
SUM OF ALL RESPONSES TO PHQ QUESTIONS 1-9: 0
SUM OF ALL RESPONSES TO PHQ9 QUESTIONS 1 & 2: 0

## 2024-03-14 ENCOUNTER — OFFICE VISIT (OUTPATIENT)
Dept: FAMILY MEDICINE CLINIC | Age: 29
End: 2024-03-14

## 2024-03-14 VITALS
RESPIRATION RATE: 16 BRPM | HEART RATE: 80 BPM | DIASTOLIC BLOOD PRESSURE: 62 MMHG | SYSTOLIC BLOOD PRESSURE: 116 MMHG | HEIGHT: 72 IN | OXYGEN SATURATION: 98 % | WEIGHT: 313.4 LBS | BODY MASS INDEX: 42.45 KG/M2

## 2024-03-14 DIAGNOSIS — A08.4 VIRAL GASTROENTERITIS: Primary | ICD-10-CM

## 2024-03-14 DIAGNOSIS — L21.0 DANDRUFF: ICD-10-CM

## 2024-03-14 PROCEDURE — 99213 OFFICE O/P EST LOW 20 MIN: CPT | Performed by: STUDENT IN AN ORGANIZED HEALTH CARE EDUCATION/TRAINING PROGRAM

## 2024-03-14 ASSESSMENT — ENCOUNTER SYMPTOMS
NAUSEA: 1
DIARRHEA: 1
ANAL BLEEDING: 0
BLOOD IN STOOL: 0
VOMITING: 1
ABDOMINAL PAIN: 0

## 2024-03-14 NOTE — PROGRESS NOTES
[facility] PROFESSIONAL SERVS  [facility]  [address] [id number]  Dept: [phone]  Dept Fax: [phone]  Loc: [phone]    Bryan Lomeli is a 28 y.o. male who presents today for his medical conditions/complaints as noted below.     Chief Complaint   Patient presents with    Emesis     Has not tried anything OTC     Diarrhea     Sx started yesterday afternoon- need a work note        HPI:     Patient presents to the office today with his girlfriend for concerns of diarrhea and vomiting.  States that he started to develop watery, nonbloody diarrhea yesterday afternoon.  This was followed by nausea and several episodes of vomiting after eating or drinking.  Denies associated fever, chills, abdominal pain.  Last episode of diarrhea and vomiting was early this morning, but patient did call off of work due to his symptoms.  Needs a work excuse.  Has been feeling better over the last hour or so.  States that a stomach bug has been going around at work recently.    Patient also reports that he has had a lot of dandruff in his beard and scalp.  Uses old spice shampoo for washing his hair.        Past Medical History:   Diagnosis Date    Ear infection     Obesity, Class II, BMI 35-39.9       Past Surgical History:   Procedure Laterality Date    MASTOIDECTOMY Left        Family History   Problem Relation Age of Onset    Diabetes Father     High Blood Pressure Father     Colon Cancer Neg Hx     Prostate Cancer Neg Hx        Social History     Tobacco Use    Smoking status: Every Day     Current packs/day: 0.10     Types: Cigarettes, Cigars    Smokeless tobacco: Never   Substance Use Topics    Alcohol use: Yes     Comment: once monthly      No current outpatient medications on file.     No current facility-administered medications for this visit.     Allergies   Allergen Reactions    Morphine Nausea And Vomiting       Health Maintenance   Topic Date Due    Hepatitis B

## 2024-07-16 ENCOUNTER — HOSPITAL ENCOUNTER (EMERGENCY)
Age: 29
Discharge: HOME | End: 2024-07-16
Attending: EMERGENCY MEDICINE
Payer: COMMERCIAL

## 2024-07-16 VITALS
OXYGEN SATURATION: 98 % | TEMPERATURE: 99 F | SYSTOLIC BLOOD PRESSURE: 123 MMHG | BODY MASS INDEX: 40.63 KG/M2 | HEART RATE: 106 BPM | HEIGHT: 72 IN | WEIGHT: 300 LBS | DIASTOLIC BLOOD PRESSURE: 81 MMHG | RESPIRATION RATE: 16 BRPM

## 2024-07-16 DIAGNOSIS — R11.0 NAUSEA: ICD-10-CM

## 2024-07-16 DIAGNOSIS — J06.9 ACUTE UPPER RESPIRATORY INFECTION: Primary | ICD-10-CM

## 2024-07-16 LAB
INFLUENZA A BY PCR: NOT DETECTED
INFLUENZA B BY PCR: NOT DETECTED
SARS-COV-2 RNA, RT PCR: NOT DETECTED

## 2024-07-16 PROCEDURE — 87636 SARSCOV2 & INF A&B AMP PRB: CPT

## 2024-07-16 PROCEDURE — 99283 EMERGENCY DEPT VISIT LOW MDM: CPT

## 2024-07-16 PROCEDURE — 6370000000 HC RX 637 (ALT 250 FOR IP): Performed by: EMERGENCY MEDICINE

## 2024-07-16 RX ORDER — ONDANSETRON 4 MG/1
4 TABLET, ORALLY DISINTEGRATING ORAL ONCE
Status: COMPLETED | OUTPATIENT
Start: 2024-07-16 | End: 2024-07-16

## 2024-07-16 RX ORDER — ONDANSETRON 4 MG/1
4 TABLET, ORALLY DISINTEGRATING ORAL 3 TIMES DAILY PRN
Qty: 10 TABLET | Refills: 0 | Status: SHIPPED | OUTPATIENT
Start: 2024-07-16

## 2024-07-16 RX ORDER — AZITHROMYCIN 250 MG/1
TABLET, FILM COATED ORAL
Qty: 1 PACKET | Refills: 0 | Status: SHIPPED | OUTPATIENT
Start: 2024-07-16

## 2024-07-16 RX ADMIN — ONDANSETRON 4 MG: 4 TABLET, ORALLY DISINTEGRATING ORAL at 17:46

## 2024-07-16 ASSESSMENT — PAIN - FUNCTIONAL ASSESSMENT: PAIN_FUNCTIONAL_ASSESSMENT: NONE - DENIES PAIN

## 2024-07-16 NOTE — DISCHARGE INSTR - COC
Continuity of Care Form    Patient Name: Bryan Lomeli   :  1995  MRN:  [id number]    Admit date:  2024  Discharge date:  ***    Code Status Order: No Order   Advance Directives:     Admitting Physician:  No admitting provider for patient encounter.  PCP: Melany Matamoros DO    Discharging Nurse: ***  Discharging Hospital Unit/Room#: E5/E5  Discharging Unit Phone Number: ***    Emergency Contact:   Extended Emergency Contact Information  Primary Emergency Contact: Shaista Lomeli  Home Phone: [phone]  Relation: Spouse  Secondary Emergency Contact: Jessenia Lomeli   [facility]  Home Phone: [phone]  Relation: Parent    Past Surgical History:  Past Surgical History:   Procedure Laterality Date    MASTOIDECTOMY Left        Immunization History:   Immunization History   Administered Date(s) Administered    TDaP, ADACEL (age 10y-64y), BOOSTRIX (age 10y+), IM, 0.5mL 09/15/2010       Active Problems:  Patient Active Problem List   Diagnosis Code    Fatigue R53.83    Obesity, Class II, BMI 35-39.9 E66.9    Leg pain, anterior M79.606       Isolation/Infection:   Isolation            No Isolation          Patient Infection Status       Infection Onset Added Last Indicated Last Indicated By Review Planned Expiration Resolved Resolved By    None active    Resolved    COVID-19 22 COVID-19, Rapid   22 Infection     COVID-19 10/19/21 10/19/21 10/19/21 COVID-19, Rapid   21 Infection     COVID-19 21 COVID-19   21 Infection                        Nurse Assessment:  Last Vital Signs: /81   Pulse (!) 106   Temp 99 °F (37.2 °C) (Oral)   Resp 16   Ht 1.829 m (6')   Wt 136.1 kg (300 lb)   SpO2 98%   BMI 40.69 kg/m²     Last documented pain score (0-10 scale):    Last Weight:   Wt Readings from Last 1 Encounters:   24 136.1 kg (300 lb)     Mental Status:  {IP PT MENTAL STATUS:61592}    IV Access:  {MH

## 2024-07-16 NOTE — ED NOTES
Pt complains of a cough, vomiting and a headache for the last couple days. Pt states he vomited times 4 today. Pt alert, resp even and unlabored, skin pale, warm and dry.

## 2024-07-16 NOTE — ED PROVIDER NOTES
discharge or  Swelling  Respiratory: No respiratory distress, clear  Musculoskeletal:  Intact distal pulses, No edema, No tenderness, No tenderness to palpation or major deformities noted.   Integument:  No rash (on exposed areas)   Neurologic:  alert & appropriate       DIAGNOSTIC RESULTS       EKG:      RADIOLOGY:         Interpretation per the Radiologist below, if available at the time of this note:    No orders to display         LABS:  Labs Reviewed   COVID-19 & INFLUENZA COMBO   COVID-19 & INFLUENZA COMBO       All other labs were within normal range or not returned as of this dictation.      MIPS    Not applicable      EMERGENCY DEPARTMENT COURSE and DIFFERENTIAL DIAGNOSIS/MDM:   URI cough congestion posttussive vomiting.  Counseled regards to care treatment and evaluation.  Tested for COVID and flu    1)  Number and Complexity of Problems  Problem List This Visit: Cough  Problem List Items Addressed This Visit    None  Visit Diagnoses       Acute upper respiratory infection    -  Primary    Nausea                Differential Diagnosis: Viral syndrome, bronchitis, pneumonia, COVID, flu      2)  Data Reviewed    Imaging that is independently reviewed with the associated radiologist report, not interpreted by me are:  Not applicable    Imaging that is independently reviewed and interpreted by me as:  Not applicable        See more data below for the lab and radiology tests and orders.    3)  Treatment and Disposition    Shared Decision Making:  Not applicable    Decision Rules/Scores utilized:  Not applicable         FINAL  REASSESSMENT     5:54 PM       COVID and flu are negative.    PROCEDURES:   none    Procedures     FINAL IMPRESSION      1. Acute upper respiratory infection    2. Nausea          DISPOSITION/PLAN     DISPOSITION        PATIENT REFERRED TO:  Melany Matamoros,   [address] [facility] [address]  [phone]    Call   Follow up from ER condition      DISCHARGE MEDICATIONS:  New

## 2024-07-16 NOTE — DISCHARGE INSTRUCTIONS
Use Tylenol or Motrin for aches pains or fever.  Take Zofran for any nausea.  Agency diet.  Take Zithromax to treat respiratory infection.  This treats strep throat, ear infections, bronchitis and pneumonia related to bacteria.  If this is viral will not help.

## 2024-07-17 ENCOUNTER — TELEPHONE (OUTPATIENT)
Dept: FAMILY MEDICINE CLINIC | Age: 29
End: 2024-07-17

## 2024-08-16 NOTE — ED PROVIDER NOTES
Attempted to call pt in regards to testing results. Left message with call back number at this time.      Left Ventricle: Normal left ventricular systolic function with a visually estimated EF of 50 - 55%. Left ventricle size is normal. Mildly increased wall thickness. Normal wall motion. Grade I diastolic dysfunction with normal LAP.    Right Ventricle:PPM wire noted on the right side of the heart.    Aortic Valve: Sclerotic but non-stenotic aortic valve.. Moderate regurgitation. AV PHT is 364.0 ms. Vena contracta measures 0.4 cm.    Mitral Valve: Mild regurgitation.    Tricuspid Valve: Mild regurgitation. The estimated RVSP is 32 mmHg.    Pericardium: No pericardial effusion.    Image quality is good.   [facility]       CHIEF COMPLAINT       Chief Complaint   Patient presents with    Fever    Pharyngitis       Nurses Notes reviewed and I agree except as noted in the HPI. HISTORY OF PRESENT ILLNESS   Edin Small is a 26 y.o. male who presents     Patient states that for the last 2 days he has noticed sore throat, and 1 fever of 100.6. He states that he has been taking over-the-counter Tylenol which does help somewhat. Patient states that he was exposed to another individual at place of employment. He states that his employer is requiring that he be tested as well and have a negative test prior to returning to work. He makes mention that he has noticed some changes to smell and taste. REVIEW OF SYSTEMS     Review of Systems   Constitutional: Positive for fever (highest 100.6). Negative for chills and fatigue. HENT: Positive for postnasal drip and sore throat. Negative for congestion. Respiratory: Negative for cough, shortness of breath and wheezing. Cardiovascular: Negative for chest pain. Gastrointestinal: Negative for diarrhea, nausea and vomiting. Musculoskeletal: Negative for myalgias. Skin: Negative for rash. Neurological: Negative for dizziness, weakness, light-headedness, numbness and headaches. PAST MEDICAL HISTORY         Diagnosis Date    Ear infection     Obesity, Class II, BMI 35-39.9        SURGICALHISTORY     Patient  has a past surgical history that includes mastoidectomy (Left). CURRENT MEDICATIONS       Previous Medications    No medications on file       ALLERGIES     Patient is is allergic to morphine. Patients   Immunization History   Administered Date(s) Administered    Tdap (Boostrix, Adacel) 09/15/2010       FAMILY HISTORY     Patient's family history includes Diabetes in his father; High Blood Pressure in his father.     SOCIAL HISTORY     Patient  reports that he has been smoking cigarettes and cigars. He has been smoking about 0.10 packs per day. He has never used smokeless tobacco. He reports current alcohol use. He reports that he does not use drugs. PHYSICAL EXAM     ED TRIAGE VITALS  BP: 139/82, Temp: 97.9 °F (36.6 °C), Pulse: 68, Resp: 16, SpO2: 98 %,Estimated body mass index is 41.37 kg/m² as calculated from the following:    Height as of this encounter: 6' (1.829 m). Weight as of this encounter: 305 lb (138.3 kg). ,No LMP for male patient. Physical Exam  Constitutional:       General: He is not in acute distress. Appearance: He is well-developed. He is not ill-appearing, toxic-appearing or diaphoretic. HENT:      Nose: Congestion present. Mouth/Throat:      Mouth: Mucous membranes are moist. No oral lesions. Pharynx: No pharyngeal swelling, oropharyngeal exudate, posterior oropharyngeal erythema or uvula swelling. Pulmonary:      Effort: Pulmonary effort is normal. No respiratory distress. Neurological:      General: No focal deficit present. Mental Status: He is alert and oriented to person, place, and time. Psychiatric:         Mood and Affect: Mood normal.         Behavior: Behavior normal.         DIAGNOSTIC RESULTS     Labs:No results found for this visit on 01/03/21. IMAGING:    No orders to display     URGENT CARE COURSE:     Vitals:    01/03/21 1341   BP: 139/82   Pulse: 68   Resp: 16   Temp: 97.9 °F (36.6 °C)   SpO2: 98%   Weight: (!) 305 lb (138.3 kg)   Height: 6' (1.829 m)       Medications - No data to display         PROCEDURES:  None    FINAL IMPRESSION      1. COVID-19 ruled out by laboratory testing    2. Acute pharyngitis, unspecified etiology          DISPOSITION/ PLAN   Patient is discharged home with instructions to self quarantine for the next 3 to 5 days with her pending COVID-19 test.  Over-the-counter Tylenol as well as decongestants, and fluids are recommended for management of symptoms.   Discussed with patient that if he does develop